# Patient Record
Sex: MALE | Race: WHITE | NOT HISPANIC OR LATINO | Employment: OTHER | URBAN - METROPOLITAN AREA
[De-identification: names, ages, dates, MRNs, and addresses within clinical notes are randomized per-mention and may not be internally consistent; named-entity substitution may affect disease eponyms.]

---

## 2024-02-08 ENCOUNTER — APPOINTMENT (OUTPATIENT)
Dept: RADIOLOGY | Facility: HOSPITAL | Age: 69
End: 2024-02-08
Payer: COMMERCIAL

## 2024-02-08 ENCOUNTER — HOSPITAL ENCOUNTER (OUTPATIENT)
Facility: HOSPITAL | Age: 69
Setting detail: OBSERVATION
Discharge: HOME | End: 2024-02-09
Attending: STUDENT IN AN ORGANIZED HEALTH CARE EDUCATION/TRAINING PROGRAM | Admitting: SURGERY
Payer: COMMERCIAL

## 2024-02-08 DIAGNOSIS — S93.401A SPRAIN OF RIGHT ANKLE, INITIAL ENCOUNTER: Primary | ICD-10-CM

## 2024-02-08 DIAGNOSIS — S22.42XA CLOSED FRACTURE OF MULTIPLE RIBS OF LEFT SIDE, INITIAL ENCOUNTER: ICD-10-CM

## 2024-02-08 DIAGNOSIS — S22.42XA: ICD-10-CM

## 2024-02-08 LAB
ALBUMIN SERPL BCP-MCNC: 4 G/DL (ref 3.4–5)
ALP SERPL-CCNC: 112 U/L (ref 33–136)
ALT SERPL W P-5'-P-CCNC: 11 U/L (ref 10–52)
ANION GAP SERPL CALC-SCNC: 16 MMOL/L (ref 10–20)
AST SERPL W P-5'-P-CCNC: 18 U/L (ref 9–39)
BASOPHILS # BLD AUTO: 0.02 X10*3/UL (ref 0–0.1)
BASOPHILS NFR BLD AUTO: 0.3 %
BILIRUB SERPL-MCNC: 1.3 MG/DL (ref 0–1.2)
BUN SERPL-MCNC: 13 MG/DL (ref 6–23)
CALCIUM SERPL-MCNC: 9 MG/DL (ref 8.6–10.3)
CHLORIDE SERPL-SCNC: 101 MMOL/L (ref 98–107)
CK SERPL-CCNC: 78 U/L (ref 0–325)
CO2 SERPL-SCNC: 25 MMOL/L (ref 21–32)
CREAT SERPL-MCNC: 0.94 MG/DL (ref 0.5–1.3)
CRP SERPL-MCNC: 1.03 MG/DL
EGFRCR SERPLBLD CKD-EPI 2021: 89 ML/MIN/1.73M*2
EOSINOPHIL # BLD AUTO: 0.08 X10*3/UL (ref 0–0.7)
EOSINOPHIL NFR BLD AUTO: 1.2 %
ERYTHROCYTE [DISTWIDTH] IN BLOOD BY AUTOMATED COUNT: 15.5 % (ref 11.5–14.5)
ERYTHROCYTE [SEDIMENTATION RATE] IN BLOOD BY WESTERGREN METHOD: 20 MM/H (ref 0–20)
GLUCOSE SERPL-MCNC: 123 MG/DL (ref 74–99)
HCT VFR BLD AUTO: 43.3 % (ref 41–52)
HGB BLD-MCNC: 14.6 G/DL (ref 13.5–17.5)
IMM GRANULOCYTES # BLD AUTO: 0.02 X10*3/UL (ref 0–0.7)
IMM GRANULOCYTES NFR BLD AUTO: 0.3 % (ref 0–0.9)
LYMPHOCYTES # BLD AUTO: 0.87 X10*3/UL (ref 1.2–4.8)
LYMPHOCYTES NFR BLD AUTO: 13.3 %
MCH RBC QN AUTO: 29.6 PG (ref 26–34)
MCHC RBC AUTO-ENTMCNC: 33.7 G/DL (ref 32–36)
MCV RBC AUTO: 88 FL (ref 80–100)
MONOCYTES # BLD AUTO: 0.77 X10*3/UL (ref 0.1–1)
MONOCYTES NFR BLD AUTO: 11.8 %
NEUTROPHILS # BLD AUTO: 4.77 X10*3/UL (ref 1.2–7.7)
NEUTROPHILS NFR BLD AUTO: 73.1 %
NRBC BLD-RTO: 0 /100 WBCS (ref 0–0)
PLATELET # BLD AUTO: 155 X10*3/UL (ref 150–450)
POTASSIUM SERPL-SCNC: 3.5 MMOL/L (ref 3.5–5.3)
PROT SERPL-MCNC: 6.9 G/DL (ref 6.4–8.2)
RBC # BLD AUTO: 4.93 X10*6/UL (ref 4.5–5.9)
SODIUM SERPL-SCNC: 138 MMOL/L (ref 136–145)
WBC # BLD AUTO: 6.5 X10*3/UL (ref 4.4–11.3)

## 2024-02-08 PROCEDURE — 86140 C-REACTIVE PROTEIN: CPT | Performed by: PHYSICIAN ASSISTANT

## 2024-02-08 PROCEDURE — 99285 EMERGENCY DEPT VISIT HI MDM: CPT | Mod: 25,27 | Performed by: STUDENT IN AN ORGANIZED HEALTH CARE EDUCATION/TRAINING PROGRAM

## 2024-02-08 PROCEDURE — 99221 1ST HOSP IP/OBS SF/LOW 40: CPT | Performed by: NURSE PRACTITIONER

## 2024-02-08 PROCEDURE — G0378 HOSPITAL OBSERVATION PER HR: HCPCS

## 2024-02-08 PROCEDURE — 2500000001 HC RX 250 WO HCPCS SELF ADMINISTERED DRUGS (ALT 637 FOR MEDICARE OP): Performed by: NURSE PRACTITIONER

## 2024-02-08 PROCEDURE — 73610 X-RAY EXAM OF ANKLE: CPT | Mod: RT

## 2024-02-08 PROCEDURE — 85652 RBC SED RATE AUTOMATED: CPT | Performed by: PHYSICIAN ASSISTANT

## 2024-02-08 PROCEDURE — 80053 COMPREHEN METABOLIC PANEL: CPT | Performed by: PHYSICIAN ASSISTANT

## 2024-02-08 PROCEDURE — 73610 X-RAY EXAM OF ANKLE: CPT | Mod: RIGHT SIDE | Performed by: RADIOLOGY

## 2024-02-08 PROCEDURE — 71101 X-RAY EXAM UNILAT RIBS/CHEST: CPT | Mod: LT

## 2024-02-08 PROCEDURE — 85025 COMPLETE CBC W/AUTO DIFF WBC: CPT | Performed by: PHYSICIAN ASSISTANT

## 2024-02-08 PROCEDURE — 96374 THER/PROPH/DIAG INJ IV PUSH: CPT

## 2024-02-08 PROCEDURE — 2500000005 HC RX 250 GENERAL PHARMACY W/O HCPCS: Performed by: NURSE PRACTITIONER

## 2024-02-08 PROCEDURE — 2500000004 HC RX 250 GENERAL PHARMACY W/ HCPCS (ALT 636 FOR OP/ED): Performed by: NURSE PRACTITIONER

## 2024-02-08 PROCEDURE — 2500000004 HC RX 250 GENERAL PHARMACY W/ HCPCS (ALT 636 FOR OP/ED): Performed by: PHYSICIAN ASSISTANT

## 2024-02-08 PROCEDURE — 2500000001 HC RX 250 WO HCPCS SELF ADMINISTERED DRUGS (ALT 637 FOR MEDICARE OP): Performed by: PHYSICIAN ASSISTANT

## 2024-02-08 PROCEDURE — 36415 COLL VENOUS BLD VENIPUNCTURE: CPT | Performed by: PHYSICIAN ASSISTANT

## 2024-02-08 PROCEDURE — 82550 ASSAY OF CK (CPK): CPT | Performed by: PHYSICIAN ASSISTANT

## 2024-02-08 PROCEDURE — 71101 X-RAY EXAM UNILAT RIBS/CHEST: CPT | Mod: LEFT SIDE | Performed by: RADIOLOGY

## 2024-02-08 RX ORDER — IBUPROFEN 600 MG/1
600 TABLET ORAL EVERY 6 HOURS PRN
Status: DISCONTINUED | OUTPATIENT
Start: 2024-02-08 | End: 2024-02-09

## 2024-02-08 RX ORDER — GABAPENTIN 100 MG/1
100 CAPSULE ORAL 3 TIMES DAILY
Status: DISCONTINUED | OUTPATIENT
Start: 2024-02-08 | End: 2024-02-09

## 2024-02-08 RX ORDER — ACETAMINOPHEN 325 MG/1
975 TABLET ORAL EVERY 6 HOURS SCHEDULED
Status: CANCELLED | OUTPATIENT
Start: 2024-02-08

## 2024-02-08 RX ORDER — OXYCODONE HYDROCHLORIDE 5 MG/1
5 TABLET ORAL EVERY 6 HOURS PRN
Status: DISCONTINUED | OUTPATIENT
Start: 2024-02-08 | End: 2024-02-09 | Stop reason: HOSPADM

## 2024-02-08 RX ORDER — GABAPENTIN 100 MG/1
100 CAPSULE ORAL 3 TIMES DAILY
Status: CANCELLED | OUTPATIENT
Start: 2024-02-08

## 2024-02-08 RX ORDER — METHOCARBAMOL 500 MG/1
500 TABLET, FILM COATED ORAL EVERY 6 HOURS
Status: DISCONTINUED | OUTPATIENT
Start: 2024-02-08 | End: 2024-02-09 | Stop reason: HOSPADM

## 2024-02-08 RX ORDER — ONDANSETRON 4 MG/1
4 TABLET, FILM COATED ORAL EVERY 8 HOURS PRN
Status: DISCONTINUED | OUTPATIENT
Start: 2024-02-08 | End: 2024-02-09 | Stop reason: HOSPADM

## 2024-02-08 RX ORDER — POLYETHYLENE GLYCOL 3350 17 G/17G
17 POWDER, FOR SOLUTION ORAL DAILY
Status: CANCELLED | OUTPATIENT
Start: 2024-02-08

## 2024-02-08 RX ORDER — LIDOCAINE 560 MG/1
1 PATCH PERCUTANEOUS; TOPICAL; TRANSDERMAL DAILY
Status: DISCONTINUED | OUTPATIENT
Start: 2024-02-08 | End: 2024-02-08

## 2024-02-08 RX ORDER — HYDROCODONE BITARTRATE AND ACETAMINOPHEN 5; 325 MG/1; MG/1
1 TABLET ORAL ONCE
Status: COMPLETED | OUTPATIENT
Start: 2024-02-08 | End: 2024-02-08

## 2024-02-08 RX ORDER — ENOXAPARIN SODIUM 100 MG/ML
30 INJECTION SUBCUTANEOUS EVERY 12 HOURS
Status: DISCONTINUED | OUTPATIENT
Start: 2024-02-08 | End: 2024-02-09 | Stop reason: HOSPADM

## 2024-02-08 RX ORDER — POLYETHYLENE GLYCOL 3350 17 G/17G
17 POWDER, FOR SOLUTION ORAL DAILY
Status: DISCONTINUED | OUTPATIENT
Start: 2024-02-08 | End: 2024-02-09 | Stop reason: HOSPADM

## 2024-02-08 RX ORDER — METHOCARBAMOL 500 MG/1
500 TABLET, FILM COATED ORAL EVERY 6 HOURS
Status: CANCELLED | OUTPATIENT
Start: 2024-02-08

## 2024-02-08 RX ORDER — LIDOCAINE 560 MG/1
1 PATCH PERCUTANEOUS; TOPICAL; TRANSDERMAL DAILY
Status: CANCELLED | OUTPATIENT
Start: 2024-02-08

## 2024-02-08 RX ORDER — ONDANSETRON HYDROCHLORIDE 2 MG/ML
4 INJECTION, SOLUTION INTRAVENOUS EVERY 8 HOURS PRN
Status: DISCONTINUED | OUTPATIENT
Start: 2024-02-08 | End: 2024-02-09 | Stop reason: HOSPADM

## 2024-02-08 RX ORDER — ACETAMINOPHEN 325 MG/1
975 TABLET ORAL EVERY 6 HOURS SCHEDULED
Status: DISCONTINUED | OUTPATIENT
Start: 2024-02-08 | End: 2024-02-09 | Stop reason: HOSPADM

## 2024-02-08 RX ORDER — LIDOCAINE 560 MG/1
1 PATCH PERCUTANEOUS; TOPICAL; TRANSDERMAL DAILY
Status: DISCONTINUED | OUTPATIENT
Start: 2024-02-08 | End: 2024-02-09 | Stop reason: HOSPADM

## 2024-02-08 RX ORDER — HYDROXYZINE HYDROCHLORIDE 25 MG/1
25 TABLET, FILM COATED ORAL EVERY 6 HOURS PRN
Status: DISCONTINUED | OUTPATIENT
Start: 2024-02-08 | End: 2024-02-09 | Stop reason: HOSPADM

## 2024-02-08 RX ORDER — MORPHINE SULFATE 4 MG/ML
4 INJECTION, SOLUTION INTRAMUSCULAR; INTRAVENOUS ONCE
Status: COMPLETED | OUTPATIENT
Start: 2024-02-08 | End: 2024-02-08

## 2024-02-08 RX ADMIN — HYDROCODONE BITARTRATE AND ACETAMINOPHEN 1 TABLET: 5; 325 TABLET ORAL at 13:52

## 2024-02-08 RX ADMIN — LIDOCAINE 1 PATCH: 4 PATCH TOPICAL at 20:03

## 2024-02-08 RX ADMIN — GABAPENTIN 100 MG: 100 CAPSULE ORAL at 20:03

## 2024-02-08 RX ADMIN — OXYCODONE HYDROCHLORIDE 5 MG: 5 TABLET ORAL at 19:59

## 2024-02-08 RX ADMIN — POLYETHYLENE GLYCOL 3350 17 G: 17 POWDER, FOR SOLUTION ORAL at 20:12

## 2024-02-08 RX ADMIN — ACETAMINOPHEN 975 MG: 325 TABLET ORAL at 23:39

## 2024-02-08 RX ADMIN — METHOCARBAMOL 500 MG: 500 TABLET ORAL at 20:03

## 2024-02-08 RX ADMIN — MORPHINE SULFATE 4 MG: 4 INJECTION, SOLUTION INTRAMUSCULAR; INTRAVENOUS at 16:49

## 2024-02-08 RX ADMIN — ENOXAPARIN SODIUM 30 MG: 30 INJECTION SUBCUTANEOUS at 20:03

## 2024-02-08 RX ADMIN — ACETAMINOPHEN 975 MG: 325 TABLET ORAL at 20:03

## 2024-02-08 RX ADMIN — HYDROXYZINE HYDROCHLORIDE 25 MG: 25 TABLET, FILM COATED ORAL at 23:40

## 2024-02-08 SDOH — SOCIAL STABILITY: SOCIAL INSECURITY: ARE YOU OR HAVE YOU BEEN THREATENED OR ABUSED PHYSICALLY, EMOTIONALLY, OR SEXUALLY BY ANYONE?: NO

## 2024-02-08 SDOH — SOCIAL STABILITY: SOCIAL INSECURITY: ARE THERE ANY APPARENT SIGNS OF INJURIES/BEHAVIORS THAT COULD BE RELATED TO ABUSE/NEGLECT?: NO

## 2024-02-08 SDOH — SOCIAL STABILITY: SOCIAL INSECURITY: DO YOU FEEL UNSAFE GOING BACK TO THE PLACE WHERE YOU ARE LIVING?: NO

## 2024-02-08 SDOH — SOCIAL STABILITY: SOCIAL INSECURITY: HAVE YOU HAD THOUGHTS OF HARMING ANYONE ELSE?: NO

## 2024-02-08 SDOH — SOCIAL STABILITY: SOCIAL INSECURITY: HAS ANYONE EVER THREATENED TO HURT YOUR FAMILY OR YOUR PETS?: NO

## 2024-02-08 SDOH — SOCIAL STABILITY: SOCIAL INSECURITY: ABUSE: ADULT

## 2024-02-08 SDOH — SOCIAL STABILITY: SOCIAL INSECURITY: WERE YOU ABLE TO COMPLETE ALL THE BEHAVIORAL HEALTH SCREENINGS?: YES

## 2024-02-08 SDOH — SOCIAL STABILITY: SOCIAL INSECURITY: DO YOU FEEL ANYONE HAS EXPLOITED OR TAKEN ADVANTAGE OF YOU FINANCIALLY OR OF YOUR PERSONAL PROPERTY?: NO

## 2024-02-08 SDOH — SOCIAL STABILITY: SOCIAL INSECURITY: DOES ANYONE TRY TO KEEP YOU FROM HAVING/CONTACTING OTHER FRIENDS OR DOING THINGS OUTSIDE YOUR HOME?: NO

## 2024-02-08 ASSESSMENT — COLUMBIA-SUICIDE SEVERITY RATING SCALE - C-SSRS
1. IN THE PAST MONTH, HAVE YOU WISHED YOU WERE DEAD OR WISHED YOU COULD GO TO SLEEP AND NOT WAKE UP?: NO
2. HAVE YOU ACTUALLY HAD ANY THOUGHTS OF KILLING YOURSELF?: NO
6. HAVE YOU EVER DONE ANYTHING, STARTED TO DO ANYTHING, OR PREPARED TO DO ANYTHING TO END YOUR LIFE?: NO

## 2024-02-08 ASSESSMENT — COGNITIVE AND FUNCTIONAL STATUS - GENERAL
WALKING IN HOSPITAL ROOM: A LITTLE
CLIMB 3 TO 5 STEPS WITH RAILING: A LITTLE
PATIENT BASELINE BEDBOUND: NO
DAILY ACTIVITIY SCORE: 24
MOBILITY SCORE: 22

## 2024-02-08 ASSESSMENT — PAIN SCALES - GENERAL
PAINLEVEL_OUTOF10: 10 - WORST POSSIBLE PAIN
PAINLEVEL_OUTOF10: 10 - WORST POSSIBLE PAIN

## 2024-02-08 ASSESSMENT — ACTIVITIES OF DAILY LIVING (ADL)
GROOMING: INDEPENDENT
ADEQUATE_TO_COMPLETE_ADL: YES
TOILETING: INDEPENDENT
WALKS IN HOME: INDEPENDENT
ASSISTIVE_DEVICE: WALKER
WALKS IN HOME: INDEPENDENT
JUDGMENT_ADEQUATE_SAFELY_COMPLETE_DAILY_ACTIVITIES: YES
HEARING - LEFT EAR: FUNCTIONAL
BATHING: INDEPENDENT
HEARING - RIGHT EAR: FUNCTIONAL
PATIENT'S MEMORY ADEQUATE TO SAFELY COMPLETE DAILY ACTIVITIES?: YES
DRESSING YOURSELF: INDEPENDENT
LACK_OF_TRANSPORTATION: PATIENT DECLINED
PATIENT'S MEMORY ADEQUATE TO SAFELY COMPLETE DAILY ACTIVITIES?: YES
FEEDING YOURSELF: INDEPENDENT
ASSISTIVE_DEVICE: WALKER
DRESSING YOURSELF: INDEPENDENT
GROOMING: INDEPENDENT
JUDGMENT_ADEQUATE_SAFELY_COMPLETE_DAILY_ACTIVITIES: YES
ADEQUATE_TO_COMPLETE_ADL: YES
HEARING - LEFT EAR: FUNCTIONAL
LACK_OF_TRANSPORTATION: PATIENT DECLINED
BATHING: INDEPENDENT
TOILETING: INDEPENDENT
FEEDING YOURSELF: INDEPENDENT

## 2024-02-08 ASSESSMENT — LIFESTYLE VARIABLES
AUDIT-C TOTAL SCORE: 10
HOW OFTEN DO YOU HAVE A DRINK CONTAINING ALCOHOL: 4 OR MORE TIMES A WEEK
SKIP TO QUESTIONS 9-10: 0
AUDIT-C TOTAL SCORE: 10
HOW MANY STANDARD DRINKS CONTAINING ALCOHOL DO YOU HAVE ON A TYPICAL DAY: 5 OR 6
HOW OFTEN DO YOU HAVE 6 OR MORE DRINKS ON ONE OCCASION: DAILY OR ALMOST DAILY

## 2024-02-08 ASSESSMENT — PATIENT HEALTH QUESTIONNAIRE - PHQ9
1. LITTLE INTEREST OR PLEASURE IN DOING THINGS: NOT AT ALL
2. FEELING DOWN, DEPRESSED OR HOPELESS: NOT AT ALL
SUM OF ALL RESPONSES TO PHQ9 QUESTIONS 1 & 2: 0

## 2024-02-08 ASSESSMENT — PAIN - FUNCTIONAL ASSESSMENT: PAIN_FUNCTIONAL_ASSESSMENT: 0-10

## 2024-02-08 ASSESSMENT — PAIN DESCRIPTION - ORIENTATION
ORIENTATION: RIGHT
ORIENTATION: RIGHT

## 2024-02-08 ASSESSMENT — PAIN DESCRIPTION - LOCATION
LOCATION: ANKLE
LOCATION: RIB CAGE

## 2024-02-08 NOTE — H&P
"History Of Present Illness  Kuldeep Cespedes is a 68 y.o. male presenting with ankle pain s/p fall 4 days prior after reportedly tripping over a rug. Denies head strike or LOC. Also complaining of left rib pain.     Workup in ED identified no acute ankle fracture, but soft tissue swelling concerning for sprain. Left sided rib pain prompted Rib Xray that identified mildly displaced posterolateral aspects of left 5th, 6th and 7th ribs. CT head, neck , and chest ordered in the ED however patient refused additional imaging.      Past Medical History  Past Medical History:   Diagnosis Date    Pain in unspecified foot 12/02/2019    Foot pain     ETOH abuse     Surgical History  Past Surgical History:   Procedure Laterality Date    OTHER SURGICAL HISTORY  12/02/2019    Foot surgery        Social History  He has no history on file for tobacco use, alcohol use, and drug use.   Drinks a case of 30 beers every 4-5 days \"when I can get it\". Does not drive. Friends get him groceries or he takes [place] transport. Denies history of cigarette smoking. Denies drug use.    Family History  No family history on file.     Allergies  Patient has no known allergies.    Review of Systems   All other systems reviewed and are negative.       Physical Exam  Constitutional:       Elderly male patient sitting upright on cart in ED. Dressed in dirty Jeans and dirty White Tshirt- blood stains to pants of jeans. Disheveled appearing. Not always cooperative throughout exam       HENT:     Teeth in fair repair, missing some, MMM. Atraumatic face- nontender cranium. ROM of neck WNL without pain. No C-spine tenderness to palpate  Eyes:      sclera white  Cardiovascular:      RRR, pulses intact bilaterally   Pulmonary:     On room air. Very comfortable WOB. Able to take very deep breath without pain or splinting. No chest wall tenderness, no crepitus to palpation, no ecchymosis visible. Breath sounds completely clear throughout   Abdominal:      Abdomen " "round, non distended, soft to palpate, nontender. Small scars noted to abdomen.  Skin:     Erythematous changes to Right ankle with excoriation to inner ankle and mild bleeding. Chronically dry extremities with cristina changes to BLE.  Extremities:     BLE with 1+ pitting edema, chronic skin changes as per above. Obvious edema to right ankle, worst in medial aspect  Neurological:      A&O x3  Psychiatric:         Not always cooperative throughout exam, disgruntled     Last Recorded Vitals  Blood pressure 165/89, pulse 96, temperature 37.1 °C (98.8 °F), temperature source Temporal, resp. rate 20, height 1.854 m (6' 1\"), weight 99.8 kg (220 lb), SpO2 100 %.    Relevant Results  Results for orders placed or performed during the hospital encounter of 02/08/24 (from the past 24 hour(s))   CBC and Auto Differential   Result Value Ref Range    WBC 6.5 4.4 - 11.3 x10*3/uL    nRBC 0.0 0.0 - 0.0 /100 WBCs    RBC 4.93 4.50 - 5.90 x10*6/uL    Hemoglobin 14.6 13.5 - 17.5 g/dL    Hematocrit 43.3 41.0 - 52.0 %    MCV 88 80 - 100 fL    MCH 29.6 26.0 - 34.0 pg    MCHC 33.7 32.0 - 36.0 g/dL    RDW 15.5 (H) 11.5 - 14.5 %    Platelets 155 150 - 450 x10*3/uL    Neutrophils % 73.1 40.0 - 80.0 %    Immature Granulocytes %, Automated 0.3 0.0 - 0.9 %    Lymphocytes % 13.3 13.0 - 44.0 %    Monocytes % 11.8 2.0 - 10.0 %    Eosinophils % 1.2 0.0 - 6.0 %    Basophils % 0.3 0.0 - 2.0 %    Neutrophils Absolute 4.77 1.20 - 7.70 x10*3/uL    Immature Granulocytes Absolute, Automated 0.02 0.00 - 0.70 x10*3/uL    Lymphocytes Absolute 0.87 (L) 1.20 - 4.80 x10*3/uL    Monocytes Absolute 0.77 0.10 - 1.00 x10*3/uL    Eosinophils Absolute 0.08 0.00 - 0.70 x10*3/uL    Basophils Absolute 0.02 0.00 - 0.10 x10*3/uL   Comprehensive metabolic panel   Result Value Ref Range    Glucose 123 (H) 74 - 99 mg/dL    Sodium 138 136 - 145 mmol/L    Potassium 3.5 3.5 - 5.3 mmol/L    Chloride 101 98 - 107 mmol/L    Bicarbonate 25 21 - 32 mmol/L    Anion Gap 16 10 - 20 mmol/L "    Urea Nitrogen 13 6 - 23 mg/dL    Creatinine 0.94 0.50 - 1.30 mg/dL    eGFR 89 >60 mL/min/1.73m*2    Calcium 9.0 8.6 - 10.3 mg/dL    Albumin 4.0 3.4 - 5.0 g/dL    Alkaline Phosphatase 112 33 - 136 U/L    Total Protein 6.9 6.4 - 8.2 g/dL    AST 18 9 - 39 U/L    Bilirubin, Total 1.3 (H) 0.0 - 1.2 mg/dL    ALT 11 10 - 52 U/L   Creatine Kinase   Result Value Ref Range    Creatine Kinase 78 0 - 325 U/L   Sedimentation rate, automated   Result Value Ref Range    Sedimentation Rate 20 0 - 20 mm/h   C-reactive protein   Result Value Ref Range    C-Reactive Protein 1.03 (H) <1.00 mg/dL       XR ribs 2 views left w chest pa or ap  Result Date: 2/8/2024    1.  Left-sided rib fractures, as above. 2. No focal infiltrate or pneumothorax identified.   MACRO: None   Signed by: Daljit Humphrey 2/8/2024 3:31 PM Dictation workstation:   [id number]    XR ankle right 3+ views  Result Date: 2/8/2024    1. No evidence of acute fracture or dislocation. 2. Soft tissue swelling overlying the lateral malleolus. Clinical correlation for ankle sprain is recommended.   MACRO: None.   Signed by: Daljit Humphrey 2/8/2024 1:55 PM Dictation workstation:   [id number]          Assessment/Plan   Principal Problem:    Closed traumatic minimally displaced fracture of multiple ribs of left side, initial encounter      Kuldeep Cespedes is a 68 y.o. male presenting with ankle pain s/p fall 4 days prior after reportedly tripping over a rug. Denies head strike or LOC. Also complaining of right rib pain.     Workup in ED identified no acute ankle fracture, but soft tissue swelling concerning for sprain. Left sided rib pain prompted Rib Xray that identified mildly displaced posterolateral aspects of left 5th, 6th and 7th ribs. CT head, neck , and chest ordered in the ED however patient refused additional imaging.     List of clinically significant injuries/problems:  Displaced fractures of posterolateral aspects left 5th, 6th, and 7th ribs  Right ankle  sprain    Assessment/Plan:    # Left rib fractures  # Right ankle sprain  - multimodal pain control  - IS  - PT/OT  - WBAT RLE    # DVT ppx  - lovenox every day    Dispo: Admit to medical floor. Pending PT/OT eval. Anticipate DC home tomorrow       Patient will be discussed with Dr. Anne in am    I spent 45 minutes in the professional and overall care of this patient.      Marisabel Hammond, APRN-CNP

## 2024-02-08 NOTE — ED PROVIDER NOTES
"Limitations to History: none  External Records Reviewed  Independent Historians: none  Social determinants affecting care: none    HPI  Kuldeep Cespedes is a 68 y.o. male who presents emergency department for assessment of right ankle injury.  He reports that he fell a few days ago and he injured his right ankle.  He was unable to get up initially.  He believes that he broke his ankle.  Reports that he does have some limited range of motion.  He reports that he noticed that he had some redness and a wound to the ankle as well that was new.  Has not had fever or chills.  Denies nausea or vomiting.  He denies head injury or loss of consciousness.  Does report that he had some slight rib pain on the left.  Denies any shortness of breath.  Denies abdominal pains.  Has not had recent illness.  Denies any fever or chills.  He denies cough or congestion.  He has no further complaints.    [facility]  Past Medical History:   Diagnosis Date    Pain in unspecified foot 12/02/2019    Foot pain    reviewed by myself.    Meds  No current outpatient medications    Allergies  No Known Allergies reviewed by myself.    SHx    reviewed by myself.      ------------------------------------------------------------------------------------------------------------------------------------------    BP (!) 174/104 (BP Location: Right arm, Patient Position: Sitting)   Pulse 78   Temp 37.1 °C (98.8 °F) (Temporal)   Resp 18   Ht 1.854 m (6' 1\")   Wt 99.8 kg (220 lb)   SpO2 98%   BMI 29.03 kg/m²     Physical Exam  Vitals and nursing note reviewed.   Constitutional:       General: He is not in acute distress.     Appearance: Normal appearance. He is obese. He is not ill-appearing or toxic-appearing.      Comments: Unkept   HENT:      Head: Normocephalic. No raccoon eyes or Graff's sign.      Nose: Nose normal.      Mouth/Throat:      Mouth: Mucous membranes are moist.   Eyes:      Extraocular Movements: Extraocular movements intact.      " Conjunctiva/sclera: Conjunctivae normal.   Cardiovascular:      Rate and Rhythm: Normal rate and regular rhythm.   Pulmonary:      Effort: Pulmonary effort is normal.      Breath sounds: Normal breath sounds.   Chest:      Chest wall: Tenderness (left anterior ribs.) present. No crepitus.   Abdominal:      General: Abdomen is flat.      Palpations: Abdomen is soft.      Tenderness: There is no abdominal tenderness.   Musculoskeletal:      Cervical back: Normal range of motion and neck supple.      Right ankle: Swelling present. Tenderness present. Decreased range of motion. Normal pulse.   Skin:     General: Skin is warm and dry.      Comments: Ulceration to the right medial ankle with surrounding erythema.   Neurological:      General: No focal deficit present.      Mental Status: He is alert and oriented to person, place, and time.   Psychiatric:         Attention and Perception: Attention normal.         Mood and Affect: Mood normal.          ------------------------------------------------------------------------------------------------------------------------------------------  Imaging  XR ribs 2 views left w chest pa or ap   Final Result   1.  Left-sided rib fractures, as above.   2. No focal infiltrate or pneumothorax identified.        MACRO:   None        Signed by: Daljit Humphrey 2/8/2024 3:31 PM   Dictation workstation:   [id number]      XR ankle right 3+ views   Final Result   1. No evidence of acute fracture or dislocation.   2. Soft tissue swelling overlying the lateral malleolus. Clinical   correlation for ankle sprain is recommended.        MACRO:   None.        Signed by: Daljit Humphrey 2/8/2024 1:55 PM   Dictation workstation:   [id number]      CT chest wo IV contrast    (Results Pending)   CT cervical spine wo IV contrast    (Results Pending)   CT head wo IV contrast    (Results Pending)        Labs  Labs Reviewed   CBC WITH AUTO DIFFERENTIAL - Abnormal       Result Value    WBC 6.5      nRBC 0.0       RBC 4.93      Hemoglobin 14.6      Hematocrit 43.3      MCV 88      MCH 29.6      MCHC 33.7      RDW 15.5 (*)     Platelets 155      Neutrophils % 73.1      Immature Granulocytes %, Automated 0.3      Lymphocytes % 13.3      Monocytes % 11.8      Eosinophils % 1.2      Basophils % 0.3      Neutrophils Absolute 4.77      Immature Granulocytes Absolute, Automated 0.02      Lymphocytes Absolute 0.87 (*)     Monocytes Absolute 0.77      Eosinophils Absolute 0.08      Basophils Absolute 0.02     COMPREHENSIVE METABOLIC PANEL - Abnormal    Glucose 123 (*)     Sodium 138      Potassium 3.5      Chloride 101      Bicarbonate 25      Anion Gap 16      Urea Nitrogen 13      Creatinine 0.94      eGFR 89      Calcium 9.0      Albumin 4.0      Alkaline Phosphatase 112      Total Protein 6.9      AST 18      Bilirubin, Total 1.3 (*)     ALT 11     C-REACTIVE PROTEIN - Abnormal    C-Reactive Protein 1.03 (*)    CREATINE KINASE - Normal    Creatine Kinase 78     SEDIMENTATION RATE, AUTOMATED - Normal    Sedimentation Rate 20          ED Course  Diagnoses as of 02/08/24 1718   Sprain of right ankle, initial encounter   Closed fracture of multiple ribs of left side, initial encounter        Medical Decision Making: He did not appear ill or toxic.  Vital signs reviewed.  He is hypertensive and tachycardic.  Otherwise hemodynamically stable.  Will obtain x-ray of the ribs as well as x-ray of the ankle.  His ankle does have an ulceration that appears to be old and is erythematous.  Will obtain labs.    Differential diagnoses considered: Fracture, contusion, pneumothorax, cellulitis, others    Medications given: Oral Norco, IV morphine    I reviewed the labs from today.  No leukocytosis or leukopenia.  H&H stable.  Glucose 123.  BUN and creatinine normal.  CRP slightly elevated 1.03.  CPK normal.  Sed rate is normal.  X-ray of the ankle showing soft tissue swelling but no acute fracture.  X-ray of the ribs is showing acute Left-sided  rib fractures fifth, sixth, and seventh that are displaced.  I did add on a CT of the chest for further assessment of the rib fractures to see if there are any nondisplaced fractures.  Case discussed and evaluated with ED attending, Dr. Luna is agreeable to patient plan of care.  He does recommend obtaining CT of the head and neck due to his falls with fractures.  Patient is refusing any further CTs but is agreeable to admission.  I consulted trauma surgery.  See attestation for final patient disposition.    Diagnosis: Multiple rib fractures, ankle ulceration  Plan: Admit           Nir Shaikh PA-C  02/08/24 1715

## 2024-02-09 VITALS
DIASTOLIC BLOOD PRESSURE: 99 MMHG | WEIGHT: 220 LBS | BODY MASS INDEX: 29.16 KG/M2 | RESPIRATION RATE: 20 BRPM | OXYGEN SATURATION: 95 % | SYSTOLIC BLOOD PRESSURE: 174 MMHG | TEMPERATURE: 98.2 F | HEIGHT: 73 IN | HEART RATE: 78 BPM

## 2024-02-09 PROBLEM — S93.401A SPRAIN OF RIGHT ANKLE, INITIAL ENCOUNTER: Status: ACTIVE | Noted: 2024-02-09

## 2024-02-09 PROCEDURE — G0378 HOSPITAL OBSERVATION PER HR: HCPCS

## 2024-02-09 PROCEDURE — 2500000004 HC RX 250 GENERAL PHARMACY W/ HCPCS (ALT 636 FOR OP/ED): Performed by: NURSE PRACTITIONER

## 2024-02-09 PROCEDURE — 2500000001 HC RX 250 WO HCPCS SELF ADMINISTERED DRUGS (ALT 637 FOR MEDICARE OP): Performed by: NURSE PRACTITIONER

## 2024-02-09 PROCEDURE — 2500000001 HC RX 250 WO HCPCS SELF ADMINISTERED DRUGS (ALT 637 FOR MEDICARE OP)

## 2024-02-09 PROCEDURE — 96375 TX/PRO/DX INJ NEW DRUG ADDON: CPT

## 2024-02-09 PROCEDURE — 2500000004 HC RX 250 GENERAL PHARMACY W/ HCPCS (ALT 636 FOR OP/ED)

## 2024-02-09 PROCEDURE — 2500000005 HC RX 250 GENERAL PHARMACY W/O HCPCS: Performed by: NURSE PRACTITIONER

## 2024-02-09 PROCEDURE — 99232 SBSQ HOSP IP/OBS MODERATE 35: CPT

## 2024-02-09 RX ORDER — METHOCARBAMOL 500 MG/1
500 TABLET, FILM COATED ORAL 3 TIMES DAILY PRN
Qty: 15 TABLET | Refills: 0 | Status: SHIPPED | OUTPATIENT
Start: 2024-02-09 | End: 2024-02-14

## 2024-02-09 RX ORDER — IBUPROFEN 400 MG/1
400 TABLET ORAL EVERY 6 HOURS SCHEDULED
Status: DISCONTINUED | OUTPATIENT
Start: 2024-02-09 | End: 2024-02-09 | Stop reason: HOSPADM

## 2024-02-09 RX ORDER — LANOLIN ALCOHOL/MO/W.PET/CERES
400 CREAM (GRAM) TOPICAL DAILY
Qty: 7 TABLET | Refills: 0 | Status: SHIPPED | OUTPATIENT
Start: 2024-02-09 | End: 2024-02-16

## 2024-02-09 RX ORDER — HYDRALAZINE HYDROCHLORIDE 20 MG/ML
5 INJECTION INTRAMUSCULAR; INTRAVENOUS ONCE
Status: COMPLETED | OUTPATIENT
Start: 2024-02-09 | End: 2024-02-09

## 2024-02-09 RX ORDER — OMEPRAZOLE 20 MG/1
20 TABLET, DELAYED RELEASE ORAL
Qty: 7 TABLET | Refills: 0
Start: 2024-02-09 | End: 2024-02-16

## 2024-02-09 RX ORDER — LIDOCAINE 560 MG/1
1 PATCH PERCUTANEOUS; TOPICAL; TRANSDERMAL DAILY
Qty: 7 PATCH | Refills: 0
Start: 2024-02-09 | End: 2024-02-16

## 2024-02-09 RX ORDER — ACETAMINOPHEN 325 MG/1
975 TABLET ORAL EVERY 6 HOURS SCHEDULED
Qty: 84 TABLET | Refills: 0
Start: 2024-02-09 | End: 2024-02-16

## 2024-02-09 RX ORDER — LANOLIN ALCOHOL/MO/W.PET/CERES
400 CREAM (GRAM) TOPICAL DAILY
Status: DISCONTINUED | OUTPATIENT
Start: 2024-02-09 | End: 2024-02-09 | Stop reason: HOSPADM

## 2024-02-09 RX ORDER — PANTOPRAZOLE SODIUM 40 MG/1
40 TABLET, DELAYED RELEASE ORAL
Status: DISCONTINUED | OUTPATIENT
Start: 2024-02-09 | End: 2024-02-09 | Stop reason: HOSPADM

## 2024-02-09 RX ORDER — GABAPENTIN 300 MG/1
300 CAPSULE ORAL 3 TIMES DAILY PRN
Qty: 15 CAPSULE | Refills: 0 | Status: SHIPPED | OUTPATIENT
Start: 2024-02-09 | End: 2024-02-14

## 2024-02-09 RX ORDER — IBUPROFEN 400 MG/1
400 TABLET ORAL EVERY 6 HOURS SCHEDULED
Qty: 20 TABLET | Refills: 0
Start: 2024-02-09 | End: 2024-02-14

## 2024-02-09 RX ORDER — OXYCODONE HYDROCHLORIDE 5 MG/1
5 TABLET ORAL EVERY 6 HOURS PRN
Qty: 5 TABLET | Refills: 0 | Status: SHIPPED | OUTPATIENT
Start: 2024-02-09

## 2024-02-09 RX ORDER — GABAPENTIN 300 MG/1
300 CAPSULE ORAL 3 TIMES DAILY
Status: DISCONTINUED | OUTPATIENT
Start: 2024-02-09 | End: 2024-02-09 | Stop reason: HOSPADM

## 2024-02-09 RX ADMIN — MAGNESIUM OXIDE TAB 400 MG (241.3 MG ELEMENTAL MG) 400 MG: 400 (241.3 MG) TAB at 08:26

## 2024-02-09 RX ADMIN — OXYCODONE HYDROCHLORIDE 5 MG: 5 TABLET ORAL at 08:25

## 2024-02-09 RX ADMIN — LIDOCAINE 1 PATCH: 4 PATCH TOPICAL at 08:26

## 2024-02-09 RX ADMIN — HYDROXYZINE HYDROCHLORIDE 25 MG: 25 TABLET, FILM COATED ORAL at 06:36

## 2024-02-09 RX ADMIN — METHOCARBAMOL 500 MG: 500 TABLET ORAL at 06:30

## 2024-02-09 RX ADMIN — HYDRALAZINE HYDROCHLORIDE 5 MG: 20 INJECTION INTRAMUSCULAR; INTRAVENOUS at 08:25

## 2024-02-09 RX ADMIN — OXYCODONE HYDROCHLORIDE 5 MG: 5 TABLET ORAL at 02:13

## 2024-02-09 RX ADMIN — ACETAMINOPHEN 975 MG: 325 TABLET ORAL at 11:56

## 2024-02-09 RX ADMIN — GABAPENTIN 300 MG: 300 CAPSULE ORAL at 08:25

## 2024-02-09 RX ADMIN — ACETAMINOPHEN 975 MG: 325 TABLET ORAL at 06:30

## 2024-02-09 RX ADMIN — IBUPROFEN 400 MG: 400 TABLET, FILM COATED ORAL at 08:25

## 2024-02-09 RX ADMIN — ENOXAPARIN SODIUM 30 MG: 30 INJECTION SUBCUTANEOUS at 06:31

## 2024-02-09 RX ADMIN — METHOCARBAMOL 500 MG: 500 TABLET ORAL at 02:13

## 2024-02-09 RX ADMIN — PANTOPRAZOLE SODIUM 40 MG: 40 TABLET, DELAYED RELEASE ORAL at 08:25

## 2024-02-09 ASSESSMENT — PAIN SCALES - GENERAL
PAINLEVEL_OUTOF10: 10 - WORST POSSIBLE PAIN
PAINLEVEL_OUTOF10: 10 - WORST POSSIBLE PAIN
PAINLEVEL_OUTOF10: 7

## 2024-02-09 NOTE — PROGRESS NOTES
Occupational Therapy                 Therapy Communication Note    Patient Name: Kuldeep Cespedes  MRN: [id number]  Today's Date: 2/9/2024     Discipline: Occupational Therapy    Missed Visit Reason: Missed Visit Reason: Patient refused (Pt refusing any mobility/ADLs despite education and attempts from PT and OT. Pt states he has a w/c and walker at home. Nursing notified.)    Missed Time: Attempt

## 2024-02-09 NOTE — PROGRESS NOTES
Kuldeep Cespedes is a 68 y.o. male on day 0 of admission presenting with Closed traumatic minimally displaced fracture of multiple ribs of left side, initial encounter.    Call made to bedside. Patient alert & oriented x3. This TCC introduced myself and my role. Assessment information gathered for discharge.  Patient lives at home alone, in house. Independent of ADLs, but notes pain in his leg from sprain- falling by tripping on carpet. Patient states he does not anticipate having needs at discharge, but would prefer to stay at hospital another day of pain medications. He states he has a wheelchair at home that he plans to use.   Care Transitions will continue to follow during course of hospital stay for any changes to discharge needs.  Lois Kurtz RN

## 2024-02-09 NOTE — PROGRESS NOTES
Physical Therapy                 Therapy Communication Note    Patient Name: Kuldeep Cespedes  MRN: [id number]  Today's Date: 2/9/2024     Discipline: Physical Therapy    Missed Visit Reason: Missed Visit Reason: Patient refused (Pt refusing any mobility despite education and attempts from PT and OT. Pt states he has a w/c and walker at home. Nursing notified.)    Missed Time: Attempt

## 2024-02-09 NOTE — CARE PLAN
The patient's goals for the shift include  comfort    The clinical goals for the shift include pain control

## 2024-02-09 NOTE — NURSING NOTE
PCNA notified me that pt had a BP of 186/113 . Pt also c/o pain. Notified NP Myra Mckeon. She said to wait and administer pain meds and see if if decreased. .Pain meds administered. BP decreased.

## 2024-02-09 NOTE — DISCHARGE SUMMARY
Discharge Diagnosis  Closed traumatic minimally displaced fracture of multiple ribs of left side, initial encounter    Issues Requiring Follow-Up  Rib fractures  Hypertension  Rt ankle wound  Rt ankle sprain    Test Results Pending At Discharge  Pending Labs       No current pending labs.            Hospital Course   Kuldeep Cespedes is a 68 y.o. male presenting with ankle pain s/p fall 4 days prior after reportedly tripping over a rug. Denies head strike or LOC. Also complaining of left rib pain.      Workup in ED identified no acute ankle fracture, but soft tissue swelling concerning for sprain. Left sided rib pain prompted Rib Xray that identified mildly displaced posterolateral aspects of left 5th, 6th and 7th ribs. CT head, neck , and chest ordered in the ED however patient refused additional imaging. Pt was admitted for pain management and observation.    List of clinically significant injuries/problems:  Displaced fractures of posterolateral aspects left 5th, 6th, and 7th ribs  Right ankle sprain  Rt ankle wound - chronic, likely related to venous insufficiency or PAD    Hospital course was uncomplicated. Pt remained on RA with SpO2 in the mid-90s. Pain was well managed. He will be discharged to home with instructions to follow-up with his PCP.    Pertinent Physical Exam At Time of Discharge  Physical Exam  Constitutional:       General: He is not in acute distress.     Appearance: Normal appearance. He is not ill-appearing.   HENT:      Head: Atraumatic.   Eyes:      Extraocular Movements: Extraocular movements intact.      Pupils: Pupils are equal, round, and reactive to light.   Cardiovascular:      Rate and Rhythm: Normal rate and regular rhythm.      Heart sounds: Normal heart sounds.   Pulmonary:      Effort: Pulmonary effort is normal. No respiratory distress.      Breath sounds: Normal breath sounds.   Musculoskeletal:      Right lower leg: Edema (erythema and excorations to Rt ankle and with chronic ulcer  over medial malleolus) present.   Skin:     General: Skin is warm and dry.      Coloration: Skin is not jaundiced or pale.   Neurological:      General: No focal deficit present.      Mental Status: He is alert and oriented to person, place, and time.   Psychiatric:         Mood and Affect: Mood normal.         Behavior: Behavior normal.         Home Medications     Medication List      START taking these medications     acetaminophen 325 mg tablet; Commonly known as: Tylenol; Take 3 tablets   (975 mg) by mouth every 6 hours for 7 days.   gabapentin 300 mg capsule; Commonly known as: Neurontin; Take 1 capsule   (300 mg) by mouth 3 times a day as needed (For moderate-severe pain) for   up to 5 days.   ibuprofen 400 mg tablet; Take 1 tablet (400 mg) by mouth every 6 hours   for 5 days.   lidocaine 4 % patch; Place 1 patch over 12 hours on the skin once daily   for 7 days. Remove & discard patch within 12 hours   magnesium oxide 400 mg (241.3 mg magnesium) tablet; Commonly known as:   Mag-Ox; Take 1 tablet (400 mg) by mouth once daily for 7 days.   methocarbamol 500 mg tablet; Commonly known as: Robaxin; Take 1 tablet   (500 mg) by mouth 3 times a day as needed (Rib pain) for up to 5 days.   omeprazole OTC 20 mg EC tablet; Commonly known as: PriLOSEC OTC; Take 1   tablet (20 mg) by mouth once daily in the morning. Take before meals for 7   days. Take every day that you are taking ibuprofen to prevent ulcer   formation   oxyCODONE 5 mg immediate release tablet; Commonly known as: Roxicodone;   Take 1 tablet (5 mg) by mouth every 6 hours if needed for severe pain (7 -   10) for up to 5 doses.       Outpatient Follow-Up  No future appointments.    Malina Reyes PA-C